# Patient Record
Sex: FEMALE | Race: WHITE | NOT HISPANIC OR LATINO | URBAN - METROPOLITAN AREA
[De-identification: names, ages, dates, MRNs, and addresses within clinical notes are randomized per-mention and may not be internally consistent; named-entity substitution may affect disease eponyms.]

---

## 2020-02-02 ENCOUNTER — INPATIENT (INPATIENT)
Facility: HOSPITAL | Age: 30
LOS: 0 days | Discharge: HOME | DRG: 394 | End: 2020-02-03
Attending: FAMILY MEDICINE | Admitting: INTERNAL MEDICINE
Payer: MEDICAID

## 2020-02-02 VITALS
SYSTOLIC BLOOD PRESSURE: 102 MMHG | DIASTOLIC BLOOD PRESSURE: 65 MMHG | HEIGHT: 62 IN | WEIGHT: 119.93 LBS | TEMPERATURE: 98 F | RESPIRATION RATE: 18 BRPM | OXYGEN SATURATION: 99 % | HEART RATE: 91 BPM

## 2020-02-02 DIAGNOSIS — T18.128A FOOD IN ESOPHAGUS CAUSING OTHER INJURY, INITIAL ENCOUNTER: ICD-10-CM

## 2020-02-02 LAB
ALBUMIN SERPL ELPH-MCNC: 4.2 G/DL — SIGNIFICANT CHANGE UP (ref 3.3–5)
ALP SERPL-CCNC: 94 U/L — SIGNIFICANT CHANGE UP (ref 40–120)
ALT FLD-CCNC: 67 U/L — SIGNIFICANT CHANGE UP (ref 12–78)
ANION GAP SERPL CALC-SCNC: 11 MMOL/L — SIGNIFICANT CHANGE UP (ref 5–17)
AST SERPL-CCNC: 25 U/L — SIGNIFICANT CHANGE UP (ref 15–37)
BASOPHILS # BLD AUTO: 0.05 K/UL — SIGNIFICANT CHANGE UP (ref 0–0.2)
BASOPHILS NFR BLD AUTO: 0.4 % — SIGNIFICANT CHANGE UP (ref 0–2)
BILIRUB SERPL-MCNC: 0.9 MG/DL — SIGNIFICANT CHANGE UP (ref 0.2–1.2)
BUN SERPL-MCNC: 17 MG/DL — SIGNIFICANT CHANGE UP (ref 7–23)
CALCIUM SERPL-MCNC: 10 MG/DL — SIGNIFICANT CHANGE UP (ref 8.5–10.1)
CHLORIDE SERPL-SCNC: 104 MMOL/L — SIGNIFICANT CHANGE UP (ref 96–108)
CO2 SERPL-SCNC: 26 MMOL/L — SIGNIFICANT CHANGE UP (ref 22–31)
CREAT SERPL-MCNC: 0.57 MG/DL — SIGNIFICANT CHANGE UP (ref 0.5–1.3)
EOSINOPHIL # BLD AUTO: 0.01 K/UL — SIGNIFICANT CHANGE UP (ref 0–0.5)
EOSINOPHIL NFR BLD AUTO: 0.1 % — SIGNIFICANT CHANGE UP (ref 0–6)
GLUCOSE SERPL-MCNC: 67 MG/DL — LOW (ref 70–99)
HCT VFR BLD CALC: 46.4 % — HIGH (ref 34.5–45)
HGB BLD-MCNC: 15.5 G/DL — SIGNIFICANT CHANGE UP (ref 11.5–15.5)
IMM GRANULOCYTES NFR BLD AUTO: 0.3 % — SIGNIFICANT CHANGE UP (ref 0–1.5)
LYMPHOCYTES # BLD AUTO: 0.91 K/UL — LOW (ref 1–3.3)
LYMPHOCYTES # BLD AUTO: 7.2 % — LOW (ref 13–44)
MCHC RBC-ENTMCNC: 30.4 PG — SIGNIFICANT CHANGE UP (ref 27–34)
MCHC RBC-ENTMCNC: 33.4 GM/DL — SIGNIFICANT CHANGE UP (ref 32–36)
MCV RBC AUTO: 91 FL — SIGNIFICANT CHANGE UP (ref 80–100)
MONOCYTES # BLD AUTO: 0.68 K/UL — SIGNIFICANT CHANGE UP (ref 0–0.9)
MONOCYTES NFR BLD AUTO: 5.4 % — SIGNIFICANT CHANGE UP (ref 2–14)
NEUTROPHILS # BLD AUTO: 10.89 K/UL — HIGH (ref 1.8–7.4)
NEUTROPHILS NFR BLD AUTO: 86.6 % — HIGH (ref 43–77)
NRBC # BLD: 0 /100 WBCS — SIGNIFICANT CHANGE UP (ref 0–0)
PLATELET # BLD AUTO: 184 K/UL — SIGNIFICANT CHANGE UP (ref 150–400)
POTASSIUM SERPL-MCNC: 5.2 MMOL/L — SIGNIFICANT CHANGE UP (ref 3.5–5.3)
POTASSIUM SERPL-SCNC: 5.2 MMOL/L — SIGNIFICANT CHANGE UP (ref 3.5–5.3)
PROT SERPL-MCNC: 8 G/DL — SIGNIFICANT CHANGE UP (ref 6–8.3)
RBC # BLD: 5.1 M/UL — SIGNIFICANT CHANGE UP (ref 3.8–5.2)
RBC # FLD: 12.1 % — SIGNIFICANT CHANGE UP (ref 10.3–14.5)
SODIUM SERPL-SCNC: 141 MMOL/L — SIGNIFICANT CHANGE UP (ref 135–145)
WBC # BLD: 12.58 K/UL — HIGH (ref 3.8–10.5)
WBC # FLD AUTO: 12.58 K/UL — HIGH (ref 3.8–10.5)

## 2020-02-02 PROCEDURE — 99284 EMERGENCY DEPT VISIT MOD MDM: CPT

## 2020-02-02 PROCEDURE — 99223 1ST HOSP IP/OBS HIGH 75: CPT

## 2020-02-02 PROCEDURE — 71275 CT ANGIOGRAPHY CHEST: CPT | Mod: 26

## 2020-02-02 PROCEDURE — 74177 CT ABD & PELVIS W/CONTRAST: CPT | Mod: 26

## 2020-02-02 PROCEDURE — 93010 ELECTROCARDIOGRAM REPORT: CPT

## 2020-02-02 RX ORDER — GLUCAGON INJECTION, SOLUTION 0.5 MG/.1ML
1 INJECTION, SOLUTION SUBCUTANEOUS ONCE
Refills: 0 | Status: COMPLETED | OUTPATIENT
Start: 2020-02-02 | End: 2020-02-02

## 2020-02-02 RX ORDER — ONDANSETRON 8 MG/1
8 TABLET, FILM COATED ORAL ONCE
Refills: 0 | Status: DISCONTINUED | OUTPATIENT
Start: 2020-02-02 | End: 2020-02-02

## 2020-02-02 RX ORDER — ONDANSETRON 8 MG/1
4 TABLET, FILM COATED ORAL EVERY 6 HOURS
Refills: 0 | Status: DISCONTINUED | OUTPATIENT
Start: 2020-02-02 | End: 2020-02-03

## 2020-02-02 RX ORDER — SODIUM CHLORIDE 9 MG/ML
1000 INJECTION INTRAMUSCULAR; INTRAVENOUS; SUBCUTANEOUS
Refills: 0 | Status: DISCONTINUED | OUTPATIENT
Start: 2020-02-02 | End: 2020-02-03

## 2020-02-02 RX ORDER — NORETHINDRONE AND ETHINYL ESTRADIOL 0.4-0.035
1 KIT ORAL
Qty: 0 | Refills: 0 | DISCHARGE

## 2020-02-02 RX ORDER — ONDANSETRON 8 MG/1
4 TABLET, FILM COATED ORAL ONCE
Refills: 0 | Status: COMPLETED | OUTPATIENT
Start: 2020-02-02 | End: 2020-02-02

## 2020-02-02 RX ORDER — PANTOPRAZOLE SODIUM 20 MG/1
40 TABLET, DELAYED RELEASE ORAL ONCE
Refills: 0 | Status: DISCONTINUED | OUTPATIENT
Start: 2020-02-02 | End: 2020-02-02

## 2020-02-02 RX ORDER — PANTOPRAZOLE SODIUM 20 MG/1
40 TABLET, DELAYED RELEASE ORAL ONCE
Refills: 0 | Status: COMPLETED | OUTPATIENT
Start: 2020-02-02 | End: 2020-02-02

## 2020-02-02 RX ORDER — ONDANSETRON 8 MG/1
1 TABLET, FILM COATED ORAL
Qty: 0 | Refills: 0 | DISCHARGE

## 2020-02-02 RX ADMIN — PANTOPRAZOLE SODIUM 40 MILLIGRAM(S): 20 TABLET, DELAYED RELEASE ORAL at 14:00

## 2020-02-02 RX ADMIN — ONDANSETRON 4 MILLIGRAM(S): 8 TABLET, FILM COATED ORAL at 14:00

## 2020-02-02 RX ADMIN — GLUCAGON INJECTION, SOLUTION 1 MILLIGRAM(S): 0.5 INJECTION, SOLUTION SUBCUTANEOUS at 16:28

## 2020-02-02 RX ADMIN — SODIUM CHLORIDE 100 MILLILITER(S): 9 INJECTION INTRAMUSCULAR; INTRAVENOUS; SUBCUTANEOUS at 18:46

## 2020-02-02 NOTE — ED PROVIDER NOTE - CLINICAL SUMMARY MEDICAL DECISION MAKING FREE TEXT BOX
29 female present to Ed c/o n/v after eating a meatball. fopund to have food impaction on CT chest. Case d/w dr. romero. Will admit for extraction tomorrow. glucogon trial in ED.

## 2020-02-02 NOTE — ED PROVIDER NOTE - ATTENDING CONTRIBUTION TO CARE
28 yo female no pmhx s/p eating a meatball whole (didn't chew much) 2 days ago c/o unable to tolerate liquid or solids since then, +nausea/vomiting.  +chest discomfort.  No diarrhea    Gen: Alert, in mild distress  Head/eyes: NC/AT, PERRL, EOMI, normal lids/conjunctiva, no scleral icterus  ENT: airway patent  Neck: supple, no tenderness/meningismus/JVD, Trachea midline  Pulm/lung: Bilateral clear BS, normal resp effort, no wheeze/stridor/retractions  CV/heart: RRR, no M/R/G, +2 dist pulses (radial, pedal DP/PT, popliteal)  GI/Abd: soft, NT/ND, +BS, no guarding/rebound tenderness  Musculoskeletal: no edema/erythema/cyanosis, FROM in all extremities, no C/T/L spine ttp  Skin: no rash, no vesicles, no petechaie, no ecchymosis, no swelling  Neuro: AAOx3, CN 2-12 intact, normal sensation, 5/5 motor strength in all extremities, normal gait, no dysmetria     CT revealing food impaction at distal esophagus, GI consulted, recommend admission for endoscopy

## 2020-02-02 NOTE — ED PROVIDER NOTE - OBJECTIVE STATEMENT
30 yo female present to ED c/o nausea and vomiting, retching that began on Friday with associated chest pain, radiating to back. No PCP. Per patient she was eating a soy meatball and pain/vomiting/nausea began shortly afterwards. No health concerns. +chest pain, no SOB, no fever, chills. Pt went to urgent care earlier who gave her zofran and pepcid which she vomited up. Able to speak full sentences.

## 2020-02-02 NOTE — H&P ADULT - ASSESSMENT
Pt is a 29y F presenting from home with vomiting and chest discomfort found to have food impaction. PMH None.     Food impaction: keep NPO  -gentle IVF for hydration.   -plan for EGD in AM.   -s/p glucagon in ED.   -CT scan reviewed.   -no symptoms to correlate with jejunitis. She received protonix in ED.     Leukocytosis: likely reactive. Do not suspect infection.     DVT ppx: SCD's and encourage ambulation.   IMPROVE VTE Individual Risk Assessment    RISK                                                                Points    [  ] Previous VTE                                                  3    [  ] Thrombophilia                                               2    [  ] Lower limb paralysis                                      2        (unable to hold up >15 seconds)      [  ] Current Cancer                                              2         (within 6 months)    [  ] Immobilization > 24 hrs                                1    [  ] ICU/CCU stay > 24 hours                              1    [  ] Age > 60                                                      1    IMPROVE VTE Score ___0______    IMPROVE Score 0-1: Low Risk, No VTE prophylaxis required for most patients, encourage ambulation.   IMPROVE Score 2-3: At risk, pharmacologic VTE prophylaxis is indicated for most patients (in the absence of a contraindication)  IMPROVE Score > or = 4: High Risk, pharmacologic VTE prophylaxis is indicated for most patients (in the absence of a contraindication)

## 2020-02-02 NOTE — ED PROVIDER NOTE - CHPI ED SYMPTOMS NEG
no fever/no burning urination/no chills/no abdominal distension/no hematuria/no diarrhea/no blood in stool/no dysuria

## 2020-02-02 NOTE — H&P ADULT - NSHPPHYSICALEXAM_GEN_ALL_CORE
T(C): 36.9 (02-02-20 @ 12:24), Max: 36.9 (02-02-20 @ 12:24)  HR: 91 (02-02-20 @ 12:24) (91 - 91)  BP: 102/65 (02-02-20 @ 12:24) (102/65 - 102/65)  RR: 18 (02-02-20 @ 12:24) (18 - 18)  SpO2: 99% (02-02-20 @ 12:24) (99% - 99%)  Wt(kg): --    Physical Exam:   GENERAL: well-groomed, well-developed, NAD  HEENT: head NC/AT; EOM intact, conjunctiva & sclera clear; hearing grossly intact, moist mucous membranes  NECK: supple, no JVD  RESPIRATORY: CTA B/L, no wheezing, rales, rhonchi or rubs  CARDIOVASCULAR: S1&S2, RRR, no murmurs or gallops  ABDOMEN: soft, non-tender, non-distended, + Bowel sounds x4 quadrants, no guarding, rebound or rigidity  MUSCULOSKELETAL:  no clubbing, cyanosis or edema of all 4 extremities  LYMPH: no cervical lymphadenopathy  VASCULAR: Radial pulses 2+ bilaterally, no varicose veins   SKIN: warm and dry, color normal  NEUROLOGIC: AA&O X3, CN2-12 intact w/ no focal deficits, no sensory loss, motor Strength 5/5 in UE & LE B/L  Psych: Normal mood and affect, normal behavior

## 2020-02-02 NOTE — H&P ADULT - HISTORY OF PRESENT ILLNESS
Pt is a 29y F presenting from home with vomiting and chest discomfort found to have food impaction. PMH None.   Patient states she was eating food friday night when she took a large bite of a meatball and it became stuck in her throat. She did have lots of coughing afterwaards and also non bloody, non bilious vomiting and assocaited chest pain. She states the chest pain is only with vomiting, not assoc with exertion, Has no past cardiac hx, no past hx of GERD, reflux or gastrointestinal disease such as achalasia. Denies family hx of achalasia. Has no past hx of food impaction. She has no other complaints.   Denies headaches, nausea, vomiting, SOB, palpitations, abdominal pain, constipation, diarrhea, melena, hematochezia, dysuria.   In ED CT scan shows Mildly dilated esophagus with fluid and ingested material with abrupt transition at the distal esophagus. Unclear if this just represents transient changes related to reflux or vomiting versus possibility of food impaction at the level of the distal esophagus.Findings compatible with jejunitis.  GI was called and plan is for EGD on 2/3/2020.

## 2020-02-02 NOTE — ED ADULT NURSE NOTE - NS ED NURSE DISCH DISPOSITION
Alert and oriented to person, place, time/situation. normal mood and affect. no apparent risk to self or others.
Admitted

## 2020-02-02 NOTE — ED ADULT NURSE NOTE - OBJECTIVE STATEMENT
pt states shes been vomiting since Friday - unable to tolerate anything po- went to Urgent Care and was given Zofran and Pepcid but vomited soon after- pt denies diarrhea-states she ate a "soy meatball" on friday and has been vomiting ever since-

## 2020-02-03 VITALS
RESPIRATION RATE: 17 BRPM | DIASTOLIC BLOOD PRESSURE: 65 MMHG | SYSTOLIC BLOOD PRESSURE: 103 MMHG | OXYGEN SATURATION: 96 % | TEMPERATURE: 97 F | HEART RATE: 71 BPM

## 2020-02-03 DIAGNOSIS — T18.128A FOOD IN ESOPHAGUS CAUSING OTHER INJURY, INITIAL ENCOUNTER: ICD-10-CM

## 2020-02-03 DIAGNOSIS — Z29.9 ENCOUNTER FOR PROPHYLACTIC MEASURES, UNSPECIFIED: ICD-10-CM

## 2020-02-03 LAB
ANION GAP SERPL CALC-SCNC: 10 MMOL/L — SIGNIFICANT CHANGE UP (ref 5–17)
BASOPHILS # BLD AUTO: 0.04 K/UL — SIGNIFICANT CHANGE UP (ref 0–0.2)
BASOPHILS NFR BLD AUTO: 0.5 % — SIGNIFICANT CHANGE UP (ref 0–2)
BUN SERPL-MCNC: 7 MG/DL — SIGNIFICANT CHANGE UP (ref 7–23)
CALCIUM SERPL-MCNC: 9 MG/DL — SIGNIFICANT CHANGE UP (ref 8.5–10.1)
CHLORIDE SERPL-SCNC: 110 MMOL/L — HIGH (ref 96–108)
CO2 SERPL-SCNC: 22 MMOL/L — SIGNIFICANT CHANGE UP (ref 22–31)
CREAT SERPL-MCNC: 0.32 MG/DL — LOW (ref 0.5–1.3)
EOSINOPHIL # BLD AUTO: 0.09 K/UL — SIGNIFICANT CHANGE UP (ref 0–0.5)
EOSINOPHIL NFR BLD AUTO: 1.1 % — SIGNIFICANT CHANGE UP (ref 0–6)
GLUCOSE SERPL-MCNC: 55 MG/DL — LOW (ref 70–99)
HCT VFR BLD CALC: 39 % — SIGNIFICANT CHANGE UP (ref 34.5–45)
HGB BLD-MCNC: 12.7 G/DL — SIGNIFICANT CHANGE UP (ref 11.5–15.5)
IMM GRANULOCYTES NFR BLD AUTO: 0.2 % — SIGNIFICANT CHANGE UP (ref 0–1.5)
LYMPHOCYTES # BLD AUTO: 1.46 K/UL — SIGNIFICANT CHANGE UP (ref 1–3.3)
LYMPHOCYTES # BLD AUTO: 17 % — SIGNIFICANT CHANGE UP (ref 13–44)
MCHC RBC-ENTMCNC: 30.5 PG — SIGNIFICANT CHANGE UP (ref 27–34)
MCHC RBC-ENTMCNC: 32.6 GM/DL — SIGNIFICANT CHANGE UP (ref 32–36)
MCV RBC AUTO: 93.8 FL — SIGNIFICANT CHANGE UP (ref 80–100)
MONOCYTES # BLD AUTO: 0.6 K/UL — SIGNIFICANT CHANGE UP (ref 0–0.9)
MONOCYTES NFR BLD AUTO: 7 % — SIGNIFICANT CHANGE UP (ref 2–14)
NEUTROPHILS # BLD AUTO: 6.36 K/UL — SIGNIFICANT CHANGE UP (ref 1.8–7.4)
NEUTROPHILS NFR BLD AUTO: 74.2 % — SIGNIFICANT CHANGE UP (ref 43–77)
NRBC # BLD: 0 /100 WBCS — SIGNIFICANT CHANGE UP (ref 0–0)
PLATELET # BLD AUTO: 152 K/UL — SIGNIFICANT CHANGE UP (ref 150–400)
POTASSIUM SERPL-MCNC: 4.5 MMOL/L — SIGNIFICANT CHANGE UP (ref 3.5–5.3)
POTASSIUM SERPL-SCNC: 4.5 MMOL/L — SIGNIFICANT CHANGE UP (ref 3.5–5.3)
RBC # BLD: 4.16 M/UL — SIGNIFICANT CHANGE UP (ref 3.8–5.2)
RBC # FLD: 12.1 % — SIGNIFICANT CHANGE UP (ref 10.3–14.5)
SODIUM SERPL-SCNC: 142 MMOL/L — SIGNIFICANT CHANGE UP (ref 135–145)
WBC # BLD: 8.57 K/UL — SIGNIFICANT CHANGE UP (ref 3.8–10.5)
WBC # FLD AUTO: 8.57 K/UL — SIGNIFICANT CHANGE UP (ref 3.8–10.5)

## 2020-02-03 PROCEDURE — 99285 EMERGENCY DEPT VISIT HI MDM: CPT

## 2020-02-03 PROCEDURE — 74177 CT ABD & PELVIS W/CONTRAST: CPT

## 2020-02-03 PROCEDURE — 83036 HEMOGLOBIN GLYCOSYLATED A1C: CPT

## 2020-02-03 PROCEDURE — 80053 COMPREHEN METABOLIC PANEL: CPT

## 2020-02-03 PROCEDURE — 84702 CHORIONIC GONADOTROPIN TEST: CPT

## 2020-02-03 PROCEDURE — 80048 BASIC METABOLIC PNL TOTAL CA: CPT

## 2020-02-03 PROCEDURE — 83690 ASSAY OF LIPASE: CPT

## 2020-02-03 PROCEDURE — 93005 ELECTROCARDIOGRAM TRACING: CPT

## 2020-02-03 PROCEDURE — 99239 HOSP IP/OBS DSCHRG MGMT >30: CPT

## 2020-02-03 PROCEDURE — 85027 COMPLETE CBC AUTOMATED: CPT

## 2020-02-03 PROCEDURE — 96375 TX/PRO/DX INJ NEW DRUG ADDON: CPT

## 2020-02-03 PROCEDURE — 96374 THER/PROPH/DIAG INJ IV PUSH: CPT

## 2020-02-03 PROCEDURE — 71275 CT ANGIOGRAPHY CHEST: CPT

## 2020-02-03 PROCEDURE — 36415 COLL VENOUS BLD VENIPUNCTURE: CPT

## 2020-02-03 RX ORDER — FAMOTIDINE 10 MG/ML
20 INJECTION INTRAVENOUS ONCE
Refills: 0 | Status: COMPLETED | OUTPATIENT
Start: 2020-02-03 | End: 2020-02-03

## 2020-02-03 RX ORDER — ACETAMINOPHEN 500 MG
1000 TABLET ORAL ONCE
Refills: 0 | Status: COMPLETED | OUTPATIENT
Start: 2020-02-03 | End: 2020-02-03

## 2020-02-03 RX ORDER — KETOROLAC TROMETHAMINE 30 MG/ML
30 SYRINGE (ML) INJECTION ONCE
Refills: 0 | Status: DISCONTINUED | OUTPATIENT
Start: 2020-02-03 | End: 2020-02-03

## 2020-02-03 RX ORDER — ONDANSETRON 8 MG/1
4 TABLET, FILM COATED ORAL ONCE
Refills: 0 | Status: DISCONTINUED | OUTPATIENT
Start: 2020-02-03 | End: 2020-02-03

## 2020-02-03 RX ORDER — PANTOPRAZOLE SODIUM 20 MG/1
40 TABLET, DELAYED RELEASE ORAL ONCE
Refills: 0 | Status: COMPLETED | OUTPATIENT
Start: 2020-02-03 | End: 2020-02-03

## 2020-02-03 RX ORDER — SODIUM CHLORIDE 9 MG/ML
1000 INJECTION INTRAMUSCULAR; INTRAVENOUS; SUBCUTANEOUS
Refills: 0 | Status: DISCONTINUED | OUTPATIENT
Start: 2020-02-03 | End: 2020-02-03

## 2020-02-03 RX ORDER — SODIUM CHLORIDE 9 MG/ML
1000 INJECTION, SOLUTION INTRAVENOUS
Refills: 0 | Status: DISCONTINUED | OUTPATIENT
Start: 2020-02-03 | End: 2020-02-03

## 2020-02-03 RX ORDER — PANTOPRAZOLE SODIUM 20 MG/1
40 TABLET, DELAYED RELEASE ORAL
Refills: 0 | Status: DISCONTINUED | OUTPATIENT
Start: 2020-02-03 | End: 2020-02-03

## 2020-02-03 RX ORDER — PANTOPRAZOLE SODIUM 20 MG/1
1 TABLET, DELAYED RELEASE ORAL
Qty: 20 | Refills: 0
Start: 2020-02-03 | End: 2020-02-12

## 2020-02-03 RX ADMIN — Medication 30 MILLIGRAM(S): at 07:05

## 2020-02-03 RX ADMIN — PANTOPRAZOLE SODIUM 40 MILLIGRAM(S): 20 TABLET, DELAYED RELEASE ORAL at 01:11

## 2020-02-03 RX ADMIN — SODIUM CHLORIDE 100 MILLILITER(S): 9 INJECTION, SOLUTION INTRAVENOUS at 15:06

## 2020-02-03 RX ADMIN — PANTOPRAZOLE SODIUM 40 MILLIGRAM(S): 20 TABLET, DELAYED RELEASE ORAL at 18:04

## 2020-02-03 RX ADMIN — Medication 1000 MILLIGRAM(S): at 01:10

## 2020-02-03 RX ADMIN — Medication 400 MILLIGRAM(S): at 01:01

## 2020-02-03 RX ADMIN — Medication 30 MILLIGRAM(S): at 06:56

## 2020-02-03 RX ADMIN — ONDANSETRON 4 MILLIGRAM(S): 8 TABLET, FILM COATED ORAL at 06:05

## 2020-02-03 RX ADMIN — FAMOTIDINE 20 MILLIGRAM(S): 10 INJECTION INTRAVENOUS at 07:32

## 2020-02-03 NOTE — PROGRESS NOTE ADULT - ASSESSMENT
29y F with no PMH presented with chief complaint of vomiting and chest discomfort found to have food impaction

## 2020-02-03 NOTE — PROGRESS NOTE ADULT - SUBJECTIVE AND OBJECTIVE BOX
s/p  upper gastrointestinal endoscopy    food impaction in mid esophagus, removed  linear ulceration at site of impaction  full report dicated    rec:   soft diet  proton pump inhibitor bid  consider repeat  upper gastrointestinal endoscopy in 2 weeks to assess ulcer site  d/w patient and family  dc planning if tolerates diet

## 2020-02-03 NOTE — DISCHARGE NOTE PROVIDER - CARE PROVIDER_API CALL
Ruddy Arroyo (DO)  Gastroenterology; Internal Medicine  [address] [place] [address]  Phone: [phone]  Fax: [phone]  Follow Up Time: 2 weeks

## 2020-02-03 NOTE — DISCHARGE NOTE NURSING/CASE MANAGEMENT/SOCIAL WORK - PATIENT PORTAL LINK FT
You can access the FollowMyHealth Patient Portal offered by [facility] by registering at the following website: [email or web address]. By joining [employer]’s FollowMyHealth portal, you will also be able to view your health information using other applications (apps) compatible with our system.

## 2020-02-03 NOTE — PROGRESS NOTE ADULT - SUBJECTIVE AND OBJECTIVE BOX
HPI:  Pt is a 29y F presenting from home with vomiting and chest discomfort found to have food impaction. PMH None.   Patient states she was eating food friday night when she took a large bite of a meatball and it became stuck in her throat. She did have lots of coughing afterwaards and also non bloody, non bilious vomiting and assocaited chest pain. She states the chest pain is only with vomiting, not assoc with exertion, Has no past cardiac hx, no past hx of GERD, reflux or gastrointestinal disease such as achalasia. Denies family hx of achalasia. Has no past hx of food impaction. She has no other complaints.   Denies headaches, nausea, vomiting, SOB, palpitations, abdominal pain, constipation, diarrhea, melena, hematochezia, dysuria.   In ED CT scan shows Mildly dilated esophagus with fluid and ingested material with abrupt transition at the distal esophagus. Unclear if this just represents transient changes related to reflux or vomiting versus possibility of food impaction at the level of the distal esophagus.Findings compatible with jejunitis.  GI was called and plan is for EGD on 2/3/2020. (02 Feb 2020 16:53)    Overnight, patient had continued epigastric and chest discomfort, which was thought to be 2/2 food impaction. She was given toradol and pepcid. Patient seen and examined at bedside. Patient reports that she feels well and has no specific complaints at this time. Her nausea, vomiting, and chest pain have resolved. She denies new symptoms such as fever, chills, SOB, LE pain.    REVIEW OF SYSTEMS:    CONSTITUTIONAL: No weakness, fevers or chills  EYES/ENT: No visual changes, no throat pain   RESPIRATORY: No cough, wheezing, hemoptysis; No shortness of breath  CARDIOVASCULAR: No chest pain or palpitations  GASTROINTESTINAL: No abdominal, nausea, vomiting, or hematemesis; No diarrhea or constipation. No melena or hematochezia.  GENITOURINARY: No dysuria, frequency or hematuria  NEUROLOGICAL: No dizziness, numbness, or weakness  SKIN: No itching, burning, rashes, or lesions   All other review of systems is negative unless indicated above.    VITAL SIGNS:  Vital Signs Last 24 Hrs  T(C): 36.4 (03 Feb 2020 05:16), Max: 37.1 (02 Feb 2020 22:01)  T(F): 97.5 (03 Feb 2020 05:16), Max: 98.8 (02 Feb 2020 22:01)  HR: 61 (03 Feb 2020 05:16) (61 - 91)  BP: 100/66 (03 Feb 2020 05:16) (100/66 - 105/69)  BP(mean): --  RR: 18 (03 Feb 2020 05:16) (16 - 18)  SpO2: 97% (03 Feb 2020 05:16) (97% - 99%)    PHYSICAL EXAM:     GENERAL: no acute distress  HEENT: NC/AT, EOMI, neck supple, MMM  RESPIRATORY: LCTAB/L, no rhonchi, rales, or wheezing  CARDIOVASCULAR: RRR, no murmurs, gallops, rubs  ABDOMINAL: soft, non-tender, non-distended, positive bowel sounds   EXTREMITIES: no clubbing, cyanosis, or edema  NEUROLOGICAL: alert and oriented x 3, non-focal  SKIN: no rashes or lesions   MUSCULOSKELETAL: no gross joint deformity                        12.7   8.57  )-----------( 152      ( 03 Feb 2020 08:09 )             39.0     02-02    141  |  104  |  17  ----------------------------<  67<L>  5.2   |  26  |  0.57    Ca    10.0      02 Feb 2020 12:56    TPro  8.0  /  Alb  4.2  /  TBili  0.9  /  DBili  x   /  AST  25  /  ALT  67  /  AlkPhos  94  02-02    CAPILLARY BLOOD GLUCOSE    MEDICATIONS  (STANDING):  sodium chloride 0.9%. 1000 milliLiter(s) (100 mL/Hr) IV Continuous <Continuous> HPI:  Pt is a 29y F presenting from home with vomiting and chest discomfort found to have food impaction. PMH None.   Patient states she was eating food friday night when she took a large bite of a meatball and it became stuck in her throat. She did have lots of coughing afterwaards and also non bloody, non bilious vomiting and assocaited chest pain. She states the chest pain is only with vomiting, not assoc with exertion, Has no past cardiac hx, no past hx of GERD, reflux or gastrointestinal disease such as achalasia. Denies family hx of achalasia. Has no past hx of food impaction. She has no other complaints.   Denies headaches, nausea, vomiting, SOB, palpitations, abdominal pain, constipation, diarrhea, melena, hematochezia, dysuria.   In ED CT scan shows Mildly dilated esophagus with fluid and ingested material with abrupt transition at the distal esophagus. Unclear if this just represents transient changes related to reflux or vomiting versus possibility of food impaction at the level of the distal esophagus.Findings compatible with jejunitis.  GI was called and plan is for EGD on 2/3/2020. (02 Feb 2020 16:53)    Overnight, patient had continued epigastric and chest discomfort, which was thought to be 2/2 food impaction. She was given toradol and pepcid. Patient seen and examined at bedside. Patient reports that she feels well and has no specific complaints at this time. Her nausea, vomiting, and chest pain have resolved. She denies new symptoms such as fever, chills, SOB, LE pain.    REVIEW OF SYSTEMS:    CONSTITUTIONAL: No weakness, fevers or chills  EYES/ENT: No visual changes, no throat pain   RESPIRATORY: No cough, wheezing, hemoptysis; No shortness of breath  CARDIOVASCULAR: No chest pain or palpitations  GASTROINTESTINAL: No abdominal, nausea, vomiting, or hematemesis; No diarrhea or constipation. No melena or hematochezia.  GENITOURINARY: No dysuria, frequency or hematuria  NEUROLOGICAL: No dizziness, numbness, or weakness  SKIN: No itching, burning, rashes, or lesions   All other review of systems is negative unless indicated above.    VITAL SIGNS:  Vital Signs Last 24 Hrs  T(C): 36.4 (03 Feb 2020 05:16), Max: 37.1 (02 Feb 2020 22:01)  T(F): 97.5 (03 Feb 2020 05:16), Max: 98.8 (02 Feb 2020 22:01)  HR: 61 (03 Feb 2020 05:16) (61 - 91)  BP: 100/66 (03 Feb 2020 05:16) (100/66 - 105/69)  BP(mean): --  RR: 18 (03 Feb 2020 05:16) (16 - 18)  SpO2: 97% (03 Feb 2020 05:16) (97% - 99%)    PHYSICAL EXAM:     GENERAL: no acute distress  HEENT: NC/AT, EOMI, neck supple, MMM  RESPIRATORY: LCTAB/L, no rhonchi, rales, or wheezing  CARDIOVASCULAR: RRR, no murmurs, gallops, rubs  ABDOMINAL: soft, non-tender, non-distended, positive bowel sounds   EXTREMITIES: no clubbing, cyanosis, or edema  NEUROLOGICAL: alert and oriented x 3, non-focal  SKIN: no rashes or lesions   MUSCULOSKELETAL: no gross joint deformity                        12.7   8.57  )-----------( 152      ( 03 Feb 2020 08:09 )             39.0     02-02    141  |  104  |  17  ----------------------------<  67<L>  5.2   |  26  |  0.57    Ca    10.0      02 Feb 2020 12:56    TPro  8.0  /  Alb  4.2  /  TBili  0.9  /  DBili  x   /  AST  25  /  ALT  67  /  AlkPhos  94  02-02    CAPILLARY BLOOD GLUCOSE    MEDICATIONS  (STANDING):  sodium chloride 0.9%. 1000 milliLiter(s) (100 mL/Hr) IV Continuous <Continuous>      < from: CT Angio Chest w/ IV Cont (02.02.20 @ 15:01) >  EXAM:  CT ABDOMEN AND PELVIS IC                            PROCEDURE DATE:  02/02/2020        INTERPRETATION:  CLINICAL INFORMATION: Chest pain, vomiting.    COMPARISON: None.    PROCEDURE:   CT Angiography of the Chest was performed followed by portal venous phase imaging of the Abdomen and Pelvis.  IV Contrast: 90 ml of Omnipaque 350 was injected intravenously. 10 ml were discarded.  Oral contrast: None.  Sagittal and coronal reformatswere performed as well as 3D (MIP) reconstructions.    FINDINGS:    CHEST:     LUNGS AND LARGE AIRWAYS: Patent central airways. No pulmonary nodules.  PLEURA: No pleural effusion.  VESSELS: No pulmonary embolism. The thoracic aorta and main pulmonaryartery are normal in caliber.  HEART: Heart size is normal. No pericardial effusion.  MEDIASTINUM AND LALY: No lymphadenopathy. There is dilatation of the proximal and mid esophagus with fluid and ingested material. The distal esophagus is collapsed.Unclear if this is a transient finding related to reflux/vomiting versus food impaction. Clinical correlation is recommended.  CHEST WALL AND LOWER NECK: The thyroid gland is within normal limits. There is no supraclavicular or axillary lymphadenopathy.    ABDOMEN AND PELVIS:    LIVER: Within normal limits.  BILE DUCTS: Normal caliber.  GALLBLADDER: Within normal limits.  SPLEEN: Within normal limits.  PANCREAS: Within normal limits.  ADRENALS: Within normal limits.  KIDNEYS/URETERS: No renal stones or hydronephrosis. Symmetric enhancement.    BLADDER: Within normal limits.  REPRODUCTIVE ORGANS: Uterus and adnexa within normal limits.    BOWEL: No bowel obstruction. Appendix is not definitively identified. There are loops of proximal jejunum with diffuse wall thickening in the left upper abdomen, compatible with jejunitis.  PERITONEUM: Trace amount of free fluid in the cul-de-sac.  VESSELS: The aorta and IVC are normal in caliber and patent.  RETROPERITONEUM/LYMPH NODES: No lymphadenopathy.    ABDOMINAL WALL: Within normal limits.  BONES: Within normal limits.    IMPRESSION:     Mildly dilated esophagus with fluid and ingested material with abrupt transition at the distal esophagus. Unclear if this just represents transient changes related to reflux or vomiting versus possibility of food impaction at the level of the distal esophagus.    Findings compatible with jejunitis.    Findings were discussed with Dr. PRACHI VANCE [phone] 2/2/2020 3:13 PM by Dr. Velasquez with read back confirmation.          KIRSTY VELASQUEZ M.D. ATTENDING RADIOLOGIST  This document has been electronically signed. Feb 2 2020  3:14PM          < end of copied text >

## 2020-02-03 NOTE — CONSULT NOTE ADULT - SUBJECTIVE AND OBJECTIVE BOX
[facility]  Daryl Huang PA-C  [address] [place] [id number]  [phone]      Chief Complaint:  Patient is a 29y old  Female who presents with a chief complaint of Food impaction (2020 11:49)      HPI:Pt is a 29y F presenting from home with vomiting and chest discomfort found to have food impaction. PMH None.   Patient states she was eating food friday night when she took a large bite of a meatball and it became stuck in her throat. She did have lots of coughing afterwaards and also non bloody, non bilious vomiting and assocaited chest pain. She states the chest pain is only with vomiting, not assoc with exertion, Has no past cardiac hx, no past hx of GERD, reflux or gastrointestinal disease such as achalasia. Denies family hx of achalasia. Has no past hx of food impaction. She has no other complaints.     Allergies:  No Known Allergies      Medications:  ketorolac   Injectable 30 milliGRAM(s) IV Push once  ondansetron Injectable 4 milliGRAM(s) IV Push every 6 hours PRN  sodium chloride 0.9%. 1000 milliLiter(s) IV Continuous <Continuous>  sodium chloride 0.9%. 1000 milliLiter(s) IV Continuous <Continuous>      PMHX/PSHX:  No pertinent past medical history  No significant past surgical history      Family history:  No pertinent family history in first degree relatives      Social History: no toxic habits     ROS:     General:  No wt loss, fevers, chills, night sweats, fatigue,   Eyes:  Good vision, no reported pain  ENT:  No sore throat, pain, runny nose, dysphagia  CV:  No pain, palpitations, hypo/hypertension  Resp:  No dyspnea, cough, tachypnea, wheezing  GI:  + pain, + nausea, + vomiting, No diarrhea, No constipation, No weight loss, No fever, No pruritis, No rectal bleeding, No tarry stools, + dysphagia,  :  No pain, bleeding, incontinence, nocturia  Muscle:  No pain, weakness  Neuro:  No weakness, tingling, memory problems  Psych:  No fatigue, insomnia, mood problems, depression  Endocrine:  No polyuria, polydipsia, cold/heat intolerance  Heme:  No petechiae, ecchymosis, easy bruisability  Skin:  No rash, tattoos, scars, edema      PHYSICAL EXAM:   Vital Signs:  Vital Signs Last 24 Hrs  T(C): 36.2 (2020 13:09), Max: 37.1 (2020 22:01)  T(F): 97.2 (2020 13:09), Max: 98.8 (2020 22:01)  HR: 94 (2020 13:09) (61 - 108)  BP: 103/63 (2020 13:09) (100/66 - 111/77)  BP(mean): --  RR: 14 (2020 13:09) (14 - 18)  SpO2: 98% (2020 13:09) (97% - 99%)  Daily Height in cm: 157.48 (2020 13:09)    Daily Weight in k.2 (2020 19:08)    GENERAL:  Appears stated age, well-groomed, well-nourished, no distress  HEENT:  NC/AT,  conjunctivae clear and pink, no thyromegaly, nodules, adenopathy, no JVD, sclera -anicteric  CHEST:  Full & symmetric excursion, no increased effort, breath sounds clear  HEART:  Regular rhythm, S1, S2, no murmur/rub/S3/S4, no abdominal bruit, no edema  ABDOMEN:  Soft, non-tender, non-distended, normoactive bowel sounds,  no masses ,no hepato-splenomegaly, no signs of chronic liver disease  EXTEREMITIES:  no cyanosis,clubbing or edema  SKIN:  No rash/erythema/ecchymoses/petechiae/wounds/abscess/warm/dry  NEURO:  Alert, oriented, no asterixis, no tremor, no encephalopathy    LABS:                        12.7   8.57  )-----------( 152      ( 2020 08:09 )             39.0     02-    142  |  110<H>  |  7   ----------------------------<  55<L>  4.5   |  22  |  0.32<L>    Ca    9.0      2020 08:09    TPro  8.0  /  Alb  4.2  /  TBili  0.9  /  DBili  x   /  AST  25  /  ALT  67  /  AlkPhos  94  02-02    LIVER FUNCTIONS - ( 2020 12:56 )  Alb: 4.2 g/dL / Pro: 8.0 g/dL / ALK PHOS: 94 U/L / ALT: 67 U/L / AST: 25 U/L / GGT: x                   Imaging:

## 2020-02-03 NOTE — PROGRESS NOTE ADULT - PROBLEM SELECTOR PLAN 2
IMPROVE VTE Individual Risk Assessment    RISK                                                                Points  [  ] Previous VTE                                                  3  [  ] Thrombophilia                                               2  [  ] Lower limb paralysis                                      2        (unable to hold up >15 seconds)    [  ] Current Cancer                                              2       (within 6 months)  [  ] Immobilization > 24 hrs                                1  [  ] ICU/CCU stay > 24 hours                              1  [  ] Age > 60                                                      1  IMPROVE VTE Score ___0______  IMPROVE Score 0-1: Low Risk, No VTE prophylaxis required for most patients, encourage ambulation.   IMPROVE Score 2-3: At risk, pharmacologic VTE prophylaxis is indicated for most patients (in the absence of a contraindication)  IMPROVE Score > or = 4: High Risk, pharmacologic VTE prophylaxis is indicated for most patients (in the absence of a contraindication)  DVT ppx: SCDs, encourage ambulation

## 2020-02-03 NOTE — CHART NOTE - NSCHARTNOTEFT_GEN_A_CORE
Called by RN for epigastric and chest discomfort. Of note patient was found to have food impaction in ED. Patient seen and examined at bedside. Patient denies headaches, dizziness, sob, chest pain, nausea and vomiting. Patient denies any heart palpitation.    Vital Signs Last 24 Hrs  T(C): 37.1 (02 Feb 2020 22:01), Max: 37.1 (02 Feb 2020 22:01)  T(F): 98.8 (02 Feb 2020 22:01), Max: 98.8 (02 Feb 2020 22:01)  HR: 67 (02 Feb 2020 22:01) (67 - 91)  BP: 105/69 (02 Feb 2020 22:01) (100/69 - 105/69)  BP(mean): --  RR: 18 (02 Feb 2020 22:01) (16 - 18)  SpO2: 97% (02 Feb 2020 22:01) (97% - 99%)  [ ] room air   [ ] 02    PHYSICAL EXAM:  GENERAL: well-groomed, well-developed, NAD  HEENT: head NC/AT; EOM intact, moist mucous membranes  NECK: supple  RESPIRATORY: CTA B/L, no wheezing, rales, rhonchi or rubs  CARDIOVASCULAR: S1&S2, RRR, no murmurs or gallops  ABDOMEN: soft, non-tender, non-distended, + Bowel sounds x4 quadrants, no guarding, rebound or rigidity  MUSCULOSKELETAL:  no clubbing, cyanosis or edema of all 4 extremities  LYMPH: no cervical lymphadenopathy  VASCULAR: Radial pulses 2+ bilaterally, no varicose veins   SKIN: warm and dry, color normal  NEUROLOGIC: AA&O X3, CN2-12 intact w/ no focal deficits, no sensory loss, motor Strength 5/5 in UE & LE B/L        Assessment   Pt is a 29y F presenting from home with vomiting and chest discomfort found to have food impaction. PMH None. Currently experiencing epigastric and chest discomfort likely due to food impaction.    Plan  - Epigastric and chest discomfort  - 1g IV tylenol given and Protonix IV 40 x1  - Continue to monitor  - EGD planned for tomorrow  - RN to call for any changes Called by RN for epigastric and chest discomfort. Of note patient was found to have food impaction in ED. Patient seen and examined at bedside. Patient denies headaches, dizziness, sob, chest pain, nausea and vomiting. Patient denies any heart palpitation.    Vital Signs Last 24 Hrs  T(C): 37.1 (02 Feb 2020 22:01), Max: 37.1 (02 Feb 2020 22:01)  T(F): 98.8 (02 Feb 2020 22:01), Max: 98.8 (02 Feb 2020 22:01)  HR: 67 (02 Feb 2020 22:01) (67 - 91)  BP: 105/69 (02 Feb 2020 22:01) (100/69 - 105/69)  BP(mean): --  RR: 18 (02 Feb 2020 22:01) (16 - 18)  SpO2: 97% (02 Feb 2020 22:01) (97% - 99%)  [ ] room air   [ ] 02    PHYSICAL EXAM:  GENERAL: in mild distress, vomiting phelgm  HEENT: head NC/AT; EOM intact, moist mucous membranes  NECK: supple  RESPIRATORY: CTA B/L, no wheezing, rales, rhonchi or rubs  CARDIOVASCULAR: S1&S2, RRR, no murmurs or gallops  ABDOMEN: soft, non-tender, non-distended, + Bowel sounds x4 quadrants, no guarding, rebound or rigidity  MUSCULOSKELETAL:  no clubbing, cyanosis or edema of all 4 extremities  LYMPH: no cervical lymphadenopathy  VASCULAR: Radial pulses 2+ bilaterally, no varicose veins   SKIN: warm and dry, color normal  NEUROLOGIC: AA&O X3, CN2-12 intact w/ no focal deficits, no sensory loss, motor Strength 5/5 in UE & LE B/L        Assessment   Pt is a 29y F presenting from home with vomiting and chest discomfort found to have food impaction. PMH None. Currently experiencing epigastric and chest discomfort likely due to food impaction.    Plan  - Epigastric and chest discomfort  - 1g IV tylenol given and Protonix IV 40 x1  - Continue to monitor  - EGD possibly planned for tomorrow with dr. Rose  - RN to call for any changes    Addendum  - Patient will be given toradol and pepcid  - GI consulted, Dr. Rose following  - EGD planned

## 2020-02-03 NOTE — PROGRESS NOTE ADULT - PROBLEM SELECTOR PLAN 1
- s/p glucagon and protonix in ED  - IVF for hydration  - keep NPO  - plan for EGD today  - f/u with GI - s/p glucagon and protonix in ED  - continue with IVF and Zofran   - keep NPO  - plan for EGD today  - f/u with GI - s/p glucagon and protonix  - continue with IVF and Zofran prn  - keep NPO  - plan for EGD today  - f/u with GI  - Likely to be discharged after EGD, pending results

## 2020-02-03 NOTE — DISCHARGE NOTE PROVIDER - HOSPITAL COURSE
FROM ADMISSION H+P:     HPI:    Pt is a 29y F presenting from home with vomiting and chest discomfort found to have food impaction. PMH None.     Patient states she was eating food friday night when she took a large bite of a meatball and it became stuck in her throat. She did have lots of coughing afterwaards and also non bloody, non bilious vomiting and assocaited chest pain. She states the chest pain is only with vomiting, not assoc with exertion, Has no past cardiac hx, no past hx of GERD, reflux or gastrointestinal disease such as achalasia. Denies family hx of achalasia. Has no past hx of food impaction. She has no other complaints.     Denies headaches, nausea, vomiting, SOB, palpitations, abdominal pain, constipation, diarrhea, melena, hematochezia, dysuria.     In ED CT scan shows Mildly dilated esophagus with fluid and ingested material with abrupt transition at the distal esophagus. Unclear if this just represents transient changes related to reflux or vomiting versus possibility of food impaction at the level of the distal esophagus. Findings compatible with jejunitis.    GI was called and plan is for EGD on 2/3/2020. (02 Feb 2020 16:53)        ---    HOSPITAL COURSE:     Patient was admitted to the general medical floor with food impaction. Patient's pain was treated with IV tylenol and toradol. GERD was treated with pepcid and protonix. Nausea was treated with zofran. She was kept NPO with IVF. EGD showed ***. Patient was medically stable for discharge. Patient was seen and examined on day of discharge. She reported that her nausea had improved, but she still had some persistent chest discomfort. She denied new symptoms such as fevers, chills, sob, LE pain/edema.        T(C): 36.4 (02-03-20 @ 05:16), Max: 37.1 (02-02-20 @ 22:01)    HR: 61 (02-03-20 @ 05:16) (61 - 91)    BP: 100/66 (02-03-20 @ 05:16) (100/66 - 105/69)    RR: 18 (02-03-20 @ 05:16) (16 - 18)    SpO2: 97% (02-03-20 @ 05:16) (97% - 99%)        GENERAL: patient appears well, no acute distress, appropriate, pleasant    EYES: sclera clear, no exudates    ENMT: moist mucous membranes    NECK: supple, soft    LUNGS: good air entry bilaterally, clear to auscultation, symmetric breath sounds, no wheezing or rhonchi appreciated    HEART: soft S1/S2, regular rate and rhythm, no murmurs noted, no lower extremity edema    GASTROINTESTINAL: abdomen is soft, nontender, nondistended, normoactive bowel sounds, no palpable masses    INTEGUMENT: good skin turgor, no lesions noted    MUSCULOSKELETAL: no clubbing or cyanosis, no obvious deformity    NEUROLOGIC: awake, alert, oriented x3, good muscle tone in 4 extremities, no obvious sensory deficits    PSYCHIATRIC: mood is good, affect is congruent, linear and logical thought process    HEME/LYMPH: no obvious ecchymosis or petechiae         ---    CONSULTANTS:     Gastroenterology, Dr. Ruddy Arroyo    ---    TIME SPENT:    The total amount of time spent reviewing the hospital notes, laboratory values, imaging findings, assessing/counseling the patient, discussing with consultant physicians, social work, nursing staff took -- minutes        ---    Primary care provider was made aware of plan for discharge:      [  ] NO     [  ] YES FROM ADMISSION H+P:     HPI:    Pt is a 29y F presenting from home with vomiting and chest discomfort found to have food impaction. PMH None.     Patient states she was eating food friday night when she took a large bite of a meatball and it became stuck in her throat. She did have lots of coughing afterwaards and also non bloody, non bilious vomiting and assocaited chest pain. She states the chest pain is only with vomiting, not assoc with exertion, Has no past cardiac hx, no past hx of GERD, reflux or gastrointestinal disease such as achalasia. Denies family hx of achalasia. Has no past hx of food impaction. She has no other complaints.     Denies headaches, nausea, vomiting, SOB, palpitations, abdominal pain, constipation, diarrhea, melena, hematochezia, dysuria.     In ED CT scan shows Mildly dilated esophagus with fluid and ingested material with abrupt transition at the distal esophagus. Unclear if this just represents transient changes related to reflux or vomiting versus possibility of food impaction at the level of the distal esophagus. Findings compatible with jejunitis.    GI was called and plan is for EGD on 2/3/2020. (02 Feb 2020 16:53)        ---    HOSPITAL COURSE:     Patient was admitted to the general medical floor with food impaction. Patient's pain was treated with IV tylenol and toradol. GERD was treated with pepcid and protonix. Nausea was treated with zofran. She was kept NPO with IVF. EGD was successful in removing the food impaction. A linear ulcer was seen at thie site of impaction. . Patient was medically stable for discharge. Patient was seen and examined on day of discharge. She reported that her nausea had improved, but she still had some persistent chest discomfort. She denied new symptoms such as fevers, chills, sob, LE pain/edema.        T(C): 36.4 (02-03-20 @ 05:16), Max: 37.1 (02-02-20 @ 22:01)    HR: 61 (02-03-20 @ 05:16) (61 - 91)    BP: 100/66 (02-03-20 @ 05:16) (100/66 - 105/69)    RR: 18 (02-03-20 @ 05:16) (16 - 18)    SpO2: 97% (02-03-20 @ 05:16) (97% - 99%)        GENERAL: patient appears well, no acute distress, appropriate, pleasant    EYES: sclera clear, no exudates    ENMT: moist mucous membranes    NECK: supple, soft    LUNGS: good air entry bilaterally, clear to auscultation, symmetric breath sounds, no wheezing or rhonchi appreciated    HEART: soft S1/S2, regular rate and rhythm, no murmurs noted, no lower extremity edema    GASTROINTESTINAL: abdomen is soft, nontender, nondistended, normoactive bowel sounds, no palpable masses    INTEGUMENT: good skin turgor, no lesions noted    MUSCULOSKELETAL: no clubbing or cyanosis, no obvious deformity    NEUROLOGIC: awake, alert, oriented x3, good muscle tone in 4 extremities, no obvious sensory deficits    PSYCHIATRIC: mood is good, affect is congruent, linear and logical thought process    HEME/LYMPH: no obvious ecchymosis or petechiae         ---    CONSULTANTS:     Gastroenterology, Dr. Ruddy Arroyo    ---    TIME SPENT:    The total amount of time spent reviewing the hospital notes, laboratory values, imaging findings, assessing/counseling the patient, discussing with consultant physicians, social work, nursing staff took -- minutes        ---    Primary care provider was made aware of plan for discharge:      [  ] NO     [  ] YES FROM ADMISSION H+P:     HPI:    Pt is a 29y F presenting from home with vomiting and chest discomfort found to have food impaction. PMH None.     Patient states she was eating food friday night when she took a large bite of a meatball and it became stuck in her throat. She did have lots of coughing afterwaards and also non bloody, non bilious vomiting and assocaited chest pain. She states the chest pain is only with vomiting, not assoc with exertion, Has no past cardiac hx, no past hx of GERD, reflux or gastrointestinal disease such as achalasia. Denies family hx of achalasia. Has no past hx of food impaction. She has no other complaints.     Denies headaches, nausea, vomiting, SOB, palpitations, abdominal pain, constipation, diarrhea, melena, hematochezia, dysuria.     In ED CT scan shows Mildly dilated esophagus with fluid and ingested material with abrupt transition at the distal esophagus. Unclear if this just represents transient changes related to reflux or vomiting versus possibility of food impaction at the level of the distal esophagus. Findings compatible with jejunitis.    GI was called and plan is for EGD on 2/3/2020. (02 Feb 2020 16:53)        ---    HOSPITAL COURSE:     Patient was admitted to the general medical floor with food impaction. Patient's pain was treated with IV tylenol and toradol. GERD was treated with pepcid and protonix. Nausea was treated with zofran. She was kept NPO with IVF. EGD was successful in removing the food impaction. A linear ulcer was seen at thie site of impaction. . Patient was medically stable for discharge. Patient was seen and examined on day of discharge. She reported that her nausea had improved, but she still had some persistent chest discomfort. She denied new symptoms such as fevers, chills, sob, LE pain/edema.        T(C): 36.4 (02-03-20 @ 05:16), Max: 37.1 (02-02-20 @ 22:01)    HR: 61 (02-03-20 @ 05:16) (61 - 91)    BP: 100/66 (02-03-20 @ 05:16) (100/66 - 105/69)    RR: 18 (02-03-20 @ 05:16) (16 - 18)    SpO2: 97% (02-03-20 @ 05:16) (97% - 99%)        GENERAL: patient appears well, no acute distress, appropriate, pleasant    EYES: sclera clear, no exudates    ENMT: moist mucous membranes    NECK: supple, soft    LUNGS: good air entry bilaterally, clear to auscultation, symmetric breath sounds, no wheezing or rhonchi appreciated    HEART: soft S1/S2, regular rate and rhythm, no murmurs noted, no lower extremity edema    GASTROINTESTINAL: abdomen is soft, nontender, nondistended, normoactive bowel sounds, no palpable masses    INTEGUMENT: good skin turgor, no lesions noted    MUSCULOSKELETAL: no clubbing or cyanosis, no obvious deformity    NEUROLOGIC: awake, alert, oriented x3, good muscle tone in 4 extremities, no obvious sensory deficits    PSYCHIATRIC: mood is good, affect is congruent, linear and logical thought process    HEME/LYMPH: no obvious ecchymosis or petechiae         ---    CONSULTANTS:     Gastroenterology, Dr. Ruddy Arroyo    ---    TIME SPENT:    The total amount of time spent reviewing the hospital notes, laboratory values, imaging findings, assessing/counseling the patient, discussing with consultant physicians, social work, nursing staff took 32 minutes

## 2020-02-03 NOTE — DISCHARGE NOTE PROVIDER - NSDCCPCAREPLAN_GEN_ALL_CORE_FT
PRINCIPAL DISCHARGE DIAGNOSIS  Diagnosis: Food impaction of esophagus, initial encounter  Assessment and Plan of Treatment: - Cat scan of your chest and abdomen showed that you had food in your esophagus  - Your symptoms were treated with medications through your IV  - You had a scope of your esophagus and stomach, which showed ***  - You were seen by Gastroenterolgy  - Follow up with your primary care doctor in 1-3 days PRINCIPAL DISCHARGE DIAGNOSIS  Diagnosis: Food impaction of esophagus, initial encounter  Assessment and Plan of Treatment: - Cat scan of your chest and abdomen showed that you had food in your esophagus  - Your symptoms were treated with medications through your IV  - You had a scope of your esophagus and stomach, which removed the food impaction. A linear ulcer was seen at the site of impaction  - Start protonix as directed  - You were seen by Gastroenterolgy  - Follow up with your primary care doctor in 1-3 days  - Follow up with a gastroenterologist in 2 weeks to repeat EGD and assess ulcer

## 2020-02-03 NOTE — DISCHARGE NOTE PROVIDER - NSDCFUADDINST_GEN_ALL_CORE_FT
Please make an appointment with your primary care doctor in 1-3 days Please make an appointment with your primary care doctor in 1-3 days    Please make an appointment with a gastroenterologist in 2 weeks for a repeat EGD

## 2020-02-03 NOTE — DISCHARGE NOTE PROVIDER - NSDCMRMEDTOKEN_GEN_ALL_CORE_FT
Junel 1/20 oral tablet: 1 tab(s) orally once a day Junel 1/20 oral tablet: 1 tab(s) orally once a day  pantoprazole 40 mg oral delayed release tablet: 1 tab(s) orally 2 times a day

## 2023-06-29 NOTE — ED ADULT NURSE NOTE - NS ED NURSE REPORT GIVEN DT
Home Suture Removal Text: Patient was provided instructions on removing sutures and will remove their sutures at home.  If they have any questions or difficulties they will call the office. 02-Feb-2020 18:17